# Patient Record
Sex: MALE
[De-identification: names, ages, dates, MRNs, and addresses within clinical notes are randomized per-mention and may not be internally consistent; named-entity substitution may affect disease eponyms.]

---

## 2020-12-11 ENCOUNTER — TRANSCRIPTION ENCOUNTER (OUTPATIENT)
Age: 50
End: 2020-12-11

## 2020-12-18 ENCOUNTER — TRANSCRIPTION ENCOUNTER (OUTPATIENT)
Age: 50
End: 2020-12-18

## 2024-08-14 ENCOUNTER — APPOINTMENT (OUTPATIENT)
Dept: ORTHOPEDIC SURGERY | Facility: CLINIC | Age: 54
End: 2024-08-14
Payer: COMMERCIAL

## 2024-08-14 DIAGNOSIS — Z78.9 OTHER SPECIFIED HEALTH STATUS: ICD-10-CM

## 2024-08-14 DIAGNOSIS — M25.562 PAIN IN LEFT KNEE: ICD-10-CM

## 2024-08-14 PROCEDURE — 99204 OFFICE O/P NEW MOD 45 MIN: CPT

## 2024-08-14 PROCEDURE — 73564 X-RAY EXAM KNEE 4 OR MORE: CPT | Mod: LT

## 2024-08-14 RX ORDER — FLUOXETINE HYDROCHLORIDE 40 MG/1
40 CAPSULE ORAL
Refills: 0 | Status: ACTIVE | COMMUNITY

## 2024-08-14 RX ORDER — FLUOXETINE HYDROCHLORIDE 10 MG/1
10 CAPSULE ORAL
Refills: 0 | Status: ACTIVE | COMMUNITY

## 2024-08-14 RX ORDER — MELOXICAM 15 MG/1
15 TABLET ORAL
Qty: 30 | Refills: 1 | Status: ACTIVE | COMMUNITY
Start: 2024-08-14 | End: 1900-01-01

## 2024-08-14 NOTE — IMAGING
[Left] : left knee [All Views] : anteroposterior, lateral, skyline, and anteroposterior standing [There are no fractures, subluxations or dislocations. No significant abnormalities are seen] : There are no fractures, subluxations or dislocations. No significant abnormalities are seen [de-identified] : Left Knee Exam: Inspection: Mild effusion, no warmth, no ecchymosis Palpation: Medial joint line tenderness to palpation Range of motion 0-130 Strength: 5/5 quadriceps and hamstring strength Positive Flaca Stability: No varus or valgus instability, negative lachman Motor and sensory intact distally Gait: Mildly antalgic gait

## 2024-08-14 NOTE — ASSESSMENT
[FreeTextEntry1] : 55yo male with atraumatic left knee pain x 3 days  X-rays discussed in detail - unremarkable Due to pain on exam and swelling, will get MRI L knee to eval for MMT  Prognosis uncertain at this time  Meloxicam rx sent - r/b/a discussed in detail - instructed how to take medication Ice for inflammation  RTC after MRI to review results    The patient's current medication management of their orthopedic diagnosis was reviewed today: (1) We discussed a comprehensive treatment plan that included pharmaceutical management involving the use of prescription medications. (2) There is a moderate risk of morbidity with further treatment, especially from use of prescription strength medications and possible side effects of these medications which include upset stomach with oral medications, skin reactions to topical medications and cardiac/renal/diabetes issues with long term use. (3) I recommended that the patient follow-up with their medical physician to discuss any significant specific potential issues with long term medication use such as interactions with current medications or with exacerbation of underlying medical comorbidities. (4) The benefits and risks associated with use of injectable, oral or topical, prescription and over the counter anti-inflammatory medications were discussed with the patient. The patient voiced understanding of the risks including but not limited to bleeding, stroke, kidney dysfunction, heart disease, and were referred to the black box warning label for further information.  I am working today under the supervision of Dr. Alaniz and I am following the plan of care of Dr. Alaniz as described by him on this date. Progress note completed by Joseline Higuera PA-C under the supervision of Dr. Alaniz.

## 2024-08-14 NOTE — HISTORY OF PRESENT ILLNESS
[de-identified] : 08/14/2024 SHARRON FINNEGAN a 54-year-old male here for evaluation of his left knee. Patient states he has been having pain in the left knee since Sunday, no trauma. No injury. Carries lots of boxes up and down for work. Noticed some swelling. Has tried advil, tylenol, ice. Difficulty bearing weight.  [] : no [FreeTextEntry1] : left knee

## 2024-08-15 ENCOUNTER — APPOINTMENT (OUTPATIENT)
Dept: MRI IMAGING | Facility: CLINIC | Age: 54
End: 2024-08-15

## 2024-08-15 PROCEDURE — 73721 MRI JNT OF LWR EXTRE W/O DYE: CPT | Mod: LT

## 2024-08-22 ENCOUNTER — APPOINTMENT (OUTPATIENT)
Dept: ORTHOPEDIC SURGERY | Facility: CLINIC | Age: 54
End: 2024-08-22
Payer: COMMERCIAL

## 2024-08-22 DIAGNOSIS — S83.232D COMPLEX TEAR OF MEDIAL MENISCUS, CURRENT INJURY, LEFT KNEE, SUBSEQUENT ENCOUNTER: ICD-10-CM

## 2024-08-22 DIAGNOSIS — M17.12 UNILATERAL PRIMARY OSTEOARTHRITIS, LEFT KNEE: ICD-10-CM

## 2024-08-22 DIAGNOSIS — S83.272D COMPLEX TEAR OF LATERAL MENISCUS, CURRENT INJURY, LEFT KNEE, SUBSEQUENT ENCOUNTER: ICD-10-CM

## 2024-08-22 DIAGNOSIS — M25.462 EFFUSION, LEFT KNEE: ICD-10-CM

## 2024-08-22 PROCEDURE — 99213 OFFICE O/P EST LOW 20 MIN: CPT | Mod: 25

## 2024-08-22 PROCEDURE — 20611 DRAIN/INJ JOINT/BURSA W/US: CPT | Mod: LT

## 2024-08-22 PROCEDURE — J3490M: CUSTOM

## 2024-08-22 NOTE — ASSESSMENT
[FreeTextEntry1] : He is feeling better with the meloxicam.  The MRI suggests arthritis exacerbation with degenerative medial and lateral meniscal tears as well as a ruptured Baker's cyst.  There is also suggestion of inflammatory arthropathy although he denies any family history.  We discussed these findings in detail.  A corticosteroid injection is planned today.  Eventually would benefit from aspiration to run synovial fluid analysis to rule out inflammatory arthropathy.  We also discussed arthroscopy to address the meniscal tears but at this time no mechanical symptoms wants to hold off on surgery.  Follow-up in a month to reassess if improvement with corticosteroid injection could consider viscosupplementation down the line.  .

## 2024-08-22 NOTE — IMAGING
[de-identified] : Knee Exam: Inspection: No effusion, no warmth, no ecchymosis Palpation: Medial joint line tenderness to palpation, negative Yanick Range of motion: 0-130 with mild anterior crepitus Strength: 5/5 quadriceps and hamstring strength Stability: Ligamentously stable Motor and sensory intact distally Gait: Non antalgic gait

## 2024-08-22 NOTE — PROCEDURE
[FreeTextEntry3] : - Large joint injection was performed of the left knee.  - The indication for this procedure was pain and inflammation. Patient has tried OTC's including aspirin, Ibuprofen, Aleve, etc or prescription NSAIDS, and/or exercises at home and/or physical therapy without satisfactory response, patient had decreased mobility in the joint and the risks benefits, and alternatives have been discussed, and verbal consent was obtained. The site was prepped with alcohol, betadine, ethyl chloride sprayed topically and sterile technique used.  - An injection of Betamethasone 2cc; Marcaine 5cc injected. - Patient was advised to call if redness, pain or fever occur, apply ice for 15 minutes out of every hour for the next 12-24 hours as tolerated and patient was advised to rest the joint(s) for 2 days.  - Ultrasound guidance was indicated for this patient due to prior failure or difficult injection. All ultrasound images have been permanently captured and stored accordingly in our picture archiving and communication system. Visualization of the needle and placement of injection was performed without complication.

## 2024-08-22 NOTE — HISTORY OF PRESENT ILLNESS
[de-identified] : 08/14/2024 SHARRON FINNEGAN a 54-year-old male here for evaluation of his left knee. Patient states he has been having pain in the left knee since Sunday, no trauma. No injury. Carries lots of boxes up and down for work. Noticed some swelling. Has tried advil, tylenol, ice. Difficulty bearing weight.   8/22/24: here to follow up on MRI results for left knee. Notes some improvement. Experiences soreness. Tried Meloxicam with relief. Wears compression sleeve.  [] : no [FreeTextEntry1] : left knee

## 2024-09-19 ENCOUNTER — APPOINTMENT (OUTPATIENT)
Dept: ORTHOPEDIC SURGERY | Facility: CLINIC | Age: 54
End: 2024-09-19